# Patient Record
Sex: FEMALE | Race: WHITE | NOT HISPANIC OR LATINO | Employment: UNEMPLOYED | URBAN - METROPOLITAN AREA
[De-identification: names, ages, dates, MRNs, and addresses within clinical notes are randomized per-mention and may not be internally consistent; named-entity substitution may affect disease eponyms.]

---

## 2022-08-16 DIAGNOSIS — N93.9 ABNORMAL VAGINAL BLEEDING: Primary | ICD-10-CM

## 2022-08-29 ENCOUNTER — HOSPITAL ENCOUNTER (EMERGENCY)
Facility: HOSPITAL | Age: 42
Discharge: HOME | End: 2022-08-29
Attending: FAMILY MEDICINE
Payer: MEDICAID

## 2022-08-29 VITALS
HEART RATE: 92 BPM | OXYGEN SATURATION: 100 % | SYSTOLIC BLOOD PRESSURE: 128 MMHG | RESPIRATION RATE: 20 BRPM | HEIGHT: 62 IN | BODY MASS INDEX: 33.33 KG/M2 | DIASTOLIC BLOOD PRESSURE: 76 MMHG | WEIGHT: 181.13 LBS | TEMPERATURE: 98 F

## 2022-08-29 DIAGNOSIS — N93.9 ABNORMAL UTERINE BLEEDING (AUB): Primary | ICD-10-CM

## 2022-08-29 LAB
ALBUMIN SERPL-MCNC: 3.8 GM/DL (ref 3.5–5)
ALBUMIN/GLOB SERPL: 1.3 RATIO (ref 1.1–2)
ALP SERPL-CCNC: 37 UNIT/L (ref 40–150)
ALT SERPL-CCNC: 18 UNIT/L (ref 0–55)
APPEARANCE UR: CLEAR
AST SERPL-CCNC: 20 UNIT/L (ref 5–34)
B-HCG UR QL: NEGATIVE
BACTERIA #/AREA URNS AUTO: ABNORMAL /HPF
BASOPHILS # BLD AUTO: 0.04 X10(3)/MCL (ref 0–0.2)
BASOPHILS NFR BLD AUTO: 0.5 %
BILIRUB UR QL STRIP.AUTO: NEGATIVE MG/DL
BILIRUBIN DIRECT+TOT PNL SERPL-MCNC: 0.5 MG/DL
BUN SERPL-MCNC: 13.3 MG/DL (ref 7–18.7)
CALCIUM SERPL-MCNC: 8.9 MG/DL (ref 8.4–10.2)
CHLORIDE SERPL-SCNC: 108 MMOL/L (ref 98–107)
CO2 SERPL-SCNC: 26 MMOL/L (ref 22–29)
COLOR UR AUTO: YELLOW
CREAT SERPL-MCNC: 0.8 MG/DL (ref 0.55–1.02)
CTP QC/QA: YES
EOSINOPHIL # BLD AUTO: 0.19 X10(3)/MCL (ref 0–0.9)
EOSINOPHIL NFR BLD AUTO: 2.2 %
ERYTHROCYTE [DISTWIDTH] IN BLOOD BY AUTOMATED COUNT: 15.2 % (ref 11.5–17)
GFR SERPLBLD CREATININE-BSD FMLA CKD-EPI: >60 MLS/MIN/1.73/M2
GLOBULIN SER-MCNC: 2.9 GM/DL (ref 2.4–3.5)
GLUCOSE SERPL-MCNC: 97 MG/DL (ref 74–100)
GLUCOSE UR QL STRIP.AUTO: NORMAL MG/DL
HCT VFR BLD AUTO: 27.4 % (ref 37–47)
HGB BLD-MCNC: 8 GM/DL (ref 12–16)
HYALINE CASTS #/AREA URNS LPF: ABNORMAL /LPF
IMM GRANULOCYTES # BLD AUTO: 0.02 X10(3)/MCL (ref 0–0.04)
IMM GRANULOCYTES NFR BLD AUTO: 0.2 %
KETONES UR QL STRIP.AUTO: ABNORMAL MG/DL
LEUKOCYTE ESTERASE UR QL STRIP.AUTO: NEGATIVE UNIT/L
LYMPHOCYTES # BLD AUTO: 1.85 X10(3)/MCL (ref 0.6–4.6)
LYMPHOCYTES NFR BLD AUTO: 21.6 %
MCH RBC QN AUTO: 25.6 PG (ref 27–31)
MCHC RBC AUTO-ENTMCNC: 29.2 MG/DL (ref 33–36)
MCV RBC AUTO: 87.5 FL (ref 80–94)
MONOCYTES # BLD AUTO: 0.7 X10(3)/MCL (ref 0.1–1.3)
MONOCYTES NFR BLD AUTO: 8.2 %
MUCOUS THREADS URNS QL MICRO: ABNORMAL /LPF
NEUTROPHILS # BLD AUTO: 5.8 X10(3)/MCL (ref 2.1–9.2)
NEUTROPHILS NFR BLD AUTO: 67.3 %
NITRITE UR QL STRIP.AUTO: NEGATIVE
NRBC BLD AUTO-RTO: 0 %
PH UR STRIP.AUTO: 5.5 [PH]
PLATELET # BLD AUTO: 338 X10(3)/MCL (ref 130–400)
PMV BLD AUTO: 10.4 FL (ref 7.4–10.4)
POTASSIUM SERPL-SCNC: 4.1 MMOL/L (ref 3.5–5.1)
PROT SERPL-MCNC: 6.7 GM/DL (ref 6.4–8.3)
PROT UR QL STRIP.AUTO: ABNORMAL MG/DL
RBC # BLD AUTO: 3.13 X10(6)/MCL (ref 4.2–5.4)
RBC #/AREA URNS AUTO: ABNORMAL /HPF
RBC UR QL AUTO: ABNORMAL UNIT/L
SODIUM SERPL-SCNC: 140 MMOL/L (ref 136–145)
SP GR UR STRIP.AUTO: 1.03
SQUAMOUS #/AREA URNS LPF: ABNORMAL /HPF
UROBILINOGEN UR STRIP-ACNC: NORMAL MG/DL
WBC # SPEC AUTO: 8.6 X10(3)/MCL (ref 4.5–11.5)
WBC #/AREA URNS AUTO: ABNORMAL /HPF

## 2022-08-29 PROCEDURE — 80053 COMPREHEN METABOLIC PANEL: CPT | Performed by: FAMILY MEDICINE

## 2022-08-29 PROCEDURE — 81001 URINALYSIS AUTO W/SCOPE: CPT | Performed by: FAMILY MEDICINE

## 2022-08-29 PROCEDURE — 85025 COMPLETE CBC W/AUTO DIFF WBC: CPT | Performed by: FAMILY MEDICINE

## 2022-08-29 PROCEDURE — 36415 COLL VENOUS BLD VENIPUNCTURE: CPT | Performed by: FAMILY MEDICINE

## 2022-08-29 PROCEDURE — 81025 URINE PREGNANCY TEST: CPT | Performed by: FAMILY MEDICINE

## 2022-08-29 PROCEDURE — 99284 EMERGENCY DEPT VISIT MOD MDM: CPT | Mod: 25

## 2022-08-29 RX ORDER — IBUPROFEN 800 MG/1
800 TABLET ORAL EVERY 8 HOURS PRN
Qty: 30 TABLET | Refills: 0 | Status: SHIPPED | OUTPATIENT
Start: 2022-08-29

## 2022-08-29 RX ORDER — ONDANSETRON 4 MG/1
4 TABLET, ORALLY DISINTEGRATING ORAL EVERY 6 HOURS PRN
Qty: 10 TABLET | Refills: 0 | Status: SHIPPED | OUTPATIENT
Start: 2022-08-29 | End: 2022-09-03

## 2022-08-29 RX ORDER — MEDROXYPROGESTERONE ACETATE 10 MG/1
10 TABLET ORAL DAILY
Qty: 30 TABLET | Refills: 2 | Status: SHIPPED | OUTPATIENT
Start: 2022-08-29 | End: 2023-04-10

## 2022-08-29 NOTE — ED PROVIDER NOTES
Encounter Date: 8/29/2022       History     Chief Complaint   Patient presents with    Vaginal Bleeding     Reports persistent vaginal bleeding with lower abd pain x6 months. Was on norethindrone x2 weeks during this time which bleeding ceased but started immediately after completing that medication. Hx of ovarian cyst and thickening of the uterus.      42-year-old female presents emergency room with complaints persistent vaginal bleeding.  Patient reports having heavy menstrual bleeding for the past 6 months.  Currently awaiting to get scheduled with gyn clinic here, but reports she does not have a clinic appointment until November 7th.  Patient reports she was being treated with norethidrone x 2 weeks, but ran out of the medication on Friday (3 days prior) and has had heavy menstrual bleeding since that time.  Patient reports recently having a pelvic ultrasound on August 15th which showed a thickened endometrium.  This was performed in [place].      Review of patient's allergies indicates:  No Known Allergies  No past medical history on file.  No past surgical history on file.  No family history on file.     Review of Systems   Constitutional:  Negative for chills, fatigue and fever.   HENT:  Negative for ear pain, rhinorrhea and sore throat.    Eyes:  Negative for photophobia and pain.   Respiratory:  Negative for cough, shortness of breath and wheezing.    Cardiovascular:  Negative for chest pain.   Gastrointestinal:  Negative for abdominal pain, diarrhea, nausea and vomiting.   Genitourinary:  Positive for menstrual problem and vaginal bleeding. Negative for dysuria.   Neurological:  Negative for dizziness, weakness and headaches.   All other systems reviewed and are negative.    Physical Exam     Initial Vitals [08/29/22 1320]   BP Pulse Resp Temp SpO2   128/76 92 20 98.2 °F (36.8 °C) 100 %      MAP       --         Physical Exam    Nursing note and vitals reviewed.  Constitutional: She appears  well-developed and well-nourished. No distress.   HENT:   Head: Normocephalic and atraumatic.   Eyes: Conjunctivae and EOM are normal. Pupils are equal, round, and reactive to light.   Neck: Neck supple.   Normal range of motion.  Cardiovascular:  Normal rate, regular rhythm, normal heart sounds and intact distal pulses.           Pulmonary/Chest: Breath sounds normal. No respiratory distress. She has no wheezes. She has no rhonchi. She has no rales.   Abdominal: Abdomen is soft. Bowel sounds are normal. She exhibits no distension. There is no abdominal tenderness. There is no rebound and no guarding.   Musculoskeletal:         General: Normal range of motion.      Cervical back: Normal range of motion and neck supple.     Neurological: She is alert and oriented to person, place, and time.   Skin: Skin is warm and dry. Capillary refill takes less than 2 seconds. No erythema.   Psychiatric: She has a normal mood and affect. Her behavior is normal. Judgment and thought content normal.       ED Course   Procedures  Labs Reviewed   COMPREHENSIVE METABOLIC PANEL - Abnormal; Notable for the following components:       Result Value    Chloride 108 (*)     Alkaline Phosphatase 37 (*)     All other components within normal limits   URINALYSIS, REFLEX TO URINE CULTURE - Abnormal; Notable for the following components:    Protein, UA Trace (*)     Ketones, UA Trace (*)     Blood, UA 1+ (*)     Squamous Epithelial Cells, UA Trace (*)     Mucous, UA Moderate (*)     Hyaline Casts, UA 0-2 (*)     All other components within normal limits   CBC WITH DIFFERENTIAL - Abnormal; Notable for the following components:    RBC 3.13 (*)     Hgb 8.0 (*)     Hct 27.4 (*)     MCH 25.6 (*)     MCHC 29.2 (*)     All other components within normal limits   CBC W/ AUTO DIFFERENTIAL    Narrative:     The following orders were created for panel order CBC Auto Differential.  Procedure                               Abnormality         Status                      ---------                               -----------         ------                     CBC with Differential[[id number]]        Abnormal            Final result                 Please view results for these tests on the individual orders.   POCT URINE PREGNANCY          Imaging Results    None          Medications - No data to display              ED Course as of 08/29/22 1613   Mon Aug 29, 2022   1429 Color, UA: Yellow [MW]   1429 Appearance, UA: Clear [MW]   1429 Occult Blood UA(!): 1+ [MW]   1429 Mucous, UA(!): Moderate [MW]   1429 WBC, UA: 0-5 [MW]   1438 WBC: 8.6 [MW]   1438 Hemoglobin(!): 8.0 [MW]   1438 Hematocrit(!): 27.4 [MW]   1438 Platelets: 338 [MW]   1438 Mucous, UA(!): Moderate [MW]   1530 Discussed with GYN regarding patient - they request to come and see the patient.  Notified patient in room. [MW]   1606 Gyn has seen and evaluated the patient.  Please see consult note.  Ok to begin on Provera 10mg daily.  Will request outpatient Pelvic US.  Ok for discharge to home. [MW]      ED Course User Index  [MW] You Colón MD             Clinical Impression:   Final diagnoses:  [N93.9] Abnormal uterine bleeding (AUB) (Primary)      ED Disposition Condition    Discharge Stable          ED Prescriptions       Medication Sig Dispense Start Date End Date Auth. Provider    medroxyPROGESTERone (PROVERA) 10 MG tablet Take 1 tablet (10 mg total) by mouth once daily. 30 tablet 8/29/2022 8/29/2023 You Colón MD    ibuprofen (ADVIL,MOTRIN) 800 MG tablet Take 1 tablet (800 mg total) by mouth every 8 (eight) hours as needed for Pain. 30 tablet 8/29/2022 -- You Colón MD    ondansetron (ZOFRAN-ODT) 4 MG TbDL Take 1 tablet (4 mg total) by mouth every 6 (six) hours as needed (nausea vomiting). 10 tablet 8/29/2022 9/3/2022 You Colón MD          Follow-up Information       Follow up With Specialties Details Why Contact Info    [facility] Emergency  Medicine  As needed, If symptoms worsen [address] [address] [phone]    [facility] Gynecology   [address] [address]             You Colón MD  08/29/22 5567

## 2022-08-29 NOTE — CONSULTS
[facility] Gynecology Consult H&P     Subjective:      History of Present Illness:  Carmelo Nascimento is a 42 y.o.  presenting with 6 months hx of heavy, daily vaginal bleeding, requiring synchronous use of 2 pads and super absorbant tampons at a time. Reports going through a box of pads every other day, also notes associated cramping. States that prior to this episode she had regular monthly periods, with moderate flow. Saw a provider in [place] who prescribed her a 10 day course of Aygestin, states that her bleeding completely resolved while on this medication, only two return a day later,and two days prior to this ED presentation. No other complaints at this time, denies hx of known bleeding disorders or hx of blood transfusion.      Review of Systems:  All other systems are reviewed and are negative.    Past Medical History:  Denies     Past Surgical History:  BTL    Obstetrical History:  Term  x 4    Gynecologic History:   Previously regular periods, occurring monthly  Denies hx of abnormal pap smears    Allergies:  Review of patient's allergies indicates:  No Known Allergies    Medications:   In-Hospital Scheduled Medications:     In-Hospital PRN Medications:     In-Hospital IV Infusion Medications:     Home Medications:  Prior to Admission medications    Medication Sig Start Date End Date Taking? Authorizing Provider   ibuprofen (ADVIL,MOTRIN) 800 MG tablet Take 1 tablet (800 mg total) by mouth every 8 (eight) hours as needed for Pain. 22   You Colón MD   medroxyPROGESTERone (PROVERA) 10 MG tablet Take 1 tablet (10 mg total) by mouth once daily. 22  You Colón MD   ondansetron (ZOFRAN-ODT) 4 MG TbDL Take 1 tablet (4 mg total) by mouth every 6 (six) hours as needed (nausea vomiting). 8/29/22 9/3/22  You Colón MD       Family History:  No family history on file.    Social History:        Objective:   Last 24 Hour Vital Signs:  BP  Min: 128/76  " Max: 128/76  Temp  Av.2 °F (36.8 °C)  Min: 98.2 °F (36.8 °C)  Max: 98.2 °F (36.8 °C)  Pulse  Av  Min: 92  Max: 92  Resp  Av  Min: 20  Max: 20  SpO2  Av %  Min: 100 %  Max: 100 %  Height  Av' 2" (157.5 cm)  Min: 5' 2" (157.5 cm)  Max: 5' 2" (157.5 cm)  Weight  Av.2 kg (181 lb 1.7 oz)  Min: 82.2 kg (181 lb 1.7 oz)  Max: 82.2 kg (181 lb 1.7 oz)  No intake/output data recorded.  Body mass index is 33.13 kg/m².    Physical Examination:  Vitals:    22 1320   BP: 128/76   Pulse: 92   Resp: 20   Temp: 98.2 °F (36.8 °C)   SpO2: 100%   Weight: 82.2 kg (181 lb 1.7 oz)   Height: 5' 2" (1.575 m)       Body mass index is 33.13 kg/m².    Gen: Alert, cooperative, no distress, appears stated age  CV: RRR  Chest: CTABL, no wheezes/rales/rhonchi  Abdomen: Soft, non-tender, bowel sounds active all four quadrants, no masses  Extrem: Extremities normal, atraumatic, no cyanosis or edema.  No calf tenderness or erythema.  External genitalia: Normal female genetalia without lesion, discharge or tenderness.  Speculum Exam: Vaginal vault with scant white discharge, no blood, nonodorous, no lesions/masses seen.  Cervical os visualized as parous, closed, no lesions/masses.  Bimanual Exam: No cervical motion tenderness.  Uterus freely mobile.  Normal size uterus. No adnexal masses.  Nontender exam.    Laboratory Results:  22  CBC: 8.6>8.0/27.4<338    Radiology:  No results found.     Assessment:     Carmelo Nascimento is a 42 y.o. female who presented to the ED with heavy vaginal bleeding. Stable vital signs, no blood noted on today's exam, not symptomatic from anemia. Appropriate for discharge to home with provera tablets and outpatient management.      Recommendations:     - F/u as scheduled in Gyn clinic   - Take Provera tablets 10 mg once daily, can uptitrate to BID if needed   - Outpatient TVUS ordered     Discussed with staff, Dr. Provost Sienna Sanderson MD   [facility]/OBGYN PGY-2    "

## 2022-08-31 PROBLEM — N93.9 ABNORMAL UTERINE BLEEDING (AUB): Status: ACTIVE | Noted: 2022-08-31

## 2023-03-15 ENCOUNTER — OFFICE VISIT (OUTPATIENT)
Dept: GYNECOLOGY | Facility: CLINIC | Age: 43
End: 2023-03-15
Payer: MEDICAID

## 2023-03-15 ENCOUNTER — TELEPHONE (OUTPATIENT)
Dept: GYNECOLOGY | Facility: CLINIC | Age: 43
End: 2023-03-15
Payer: MEDICAID

## 2023-03-15 VITALS
TEMPERATURE: 98 F | OXYGEN SATURATION: 100 % | HEART RATE: 86 BPM | HEIGHT: 62 IN | RESPIRATION RATE: 18 BRPM | WEIGHT: 178.56 LBS | SYSTOLIC BLOOD PRESSURE: 124 MMHG | BODY MASS INDEX: 32.86 KG/M2 | DIASTOLIC BLOOD PRESSURE: 61 MMHG

## 2023-03-15 DIAGNOSIS — Z71.6 ENCOUNTER FOR TOBACCO USE CESSATION COUNSELING: Primary | ICD-10-CM

## 2023-03-15 DIAGNOSIS — Z12.4 CERVICAL CANCER SCREENING: ICD-10-CM

## 2023-03-15 DIAGNOSIS — N93.9 ABNORMAL VAGINAL BLEEDING: ICD-10-CM

## 2023-03-15 DIAGNOSIS — N93.9 ABNORMAL UTERINE BLEEDING (AUB): ICD-10-CM

## 2023-03-15 LAB
B-HCG UR QL: NEGATIVE
CTP QC/QA: YES

## 2023-03-15 PROCEDURE — 87624 HPV HI-RISK TYP POOLED RSLT: CPT

## 2023-03-15 PROCEDURE — 58100 BIOPSY OF UTERUS LINING: CPT | Mod: PBBFAC | Performed by: STUDENT IN AN ORGANIZED HEALTH CARE EDUCATION/TRAINING PROGRAM

## 2023-03-15 PROCEDURE — 88174 CYTOPATH C/V AUTO IN FLUID: CPT

## 2023-03-15 PROCEDURE — 81025 URINE PREGNANCY TEST: CPT | Mod: PBBFAC

## 2023-03-15 PROCEDURE — 99213 OFFICE O/P EST LOW 20 MIN: CPT | Mod: PBBFAC

## 2023-03-15 NOTE — TELEPHONE ENCOUNTER
Called pt to inform her of her upcoming virtual telemed appt on Monday, April 10, 2023 at 7:45AM. Pt confirmed and verbalized understanding.

## 2023-03-15 NOTE — PROGRESS NOTES
[facility] GYNECOLOGY CLINIC NOTE   Carmelo Nascimento is a 42 y.o.  presenting to GYN clinic for abnormal uterine bleeding. Patient's cycles became heavier and more painful after her BTL in . More recently, she was seen in the ED 22 for heavy bleeding occurring daily for 6 months. H/H on  was 8.0/27.4. Denied history of blood transfusions. TVUS showed 2 small fibroids, mild heterogenous, non-thickened endometrium. She was started on Provera which stopped her bleeding but stopped taking the Provera since she ran out. The bleeding restarted in November and lasted until 2023. She denies any vaginal bleeding since. She also denies any vaginal discharge, dysuria, or frequent UTI. Patient also complains of increased fatigue, night sweats, hot flashes, vaginal dryness and depression. She is sexually active with one partner and has occasional pain with penetration. She denies history of abnormal pap smear or sexually transmitted infections. She smokes 1 pack per day of cigarettes for 30 years and is interested in quitting.     Gynecology:  LMP: 2023  Pap smear: due for screening  Mammogram: UTD. Imaging in the last week    Obstetrics:  4 term     Gynecology  OB History          4    Para   4    Term                AB        Living   4         SAB        IAB        Ectopic        Multiple        Live Births   4                Past Medical History:   Diagnosis Date    Abnormal uterine bleeding 6months    Depression     I suffer with depression in the past but only lately has it gotten bad    Dyspareunia     Painful intercourse has been going on for a little over a year      Past Surgical History:   Procedure Laterality Date    TUBAL LIGATION  1999      Current Outpatient Medications   Medication Instructions    ibuprofen (ADVIL,MOTRIN) 800 mg, Oral, Every 8 hours PRN    medroxyPROGESTERone (PROVERA) 10 mg, Oral, Daily     Social History     Tobacco Use    Smoking  "status: Every Day     Packs/day: 1.00     Years: 15.00     Pack years: 15.00     Types: Cigarettes    Smokeless tobacco: Never   Substance Use Topics    Alcohol use: Never    Drug use: Never       Review of Systems  Pertinent items are noted in HPI.     Objective:     /61 (BP Location: Left arm, Patient Position: Sitting, BP Method: Large (Automatic))   Pulse 86   Temp 98.1 °F (36.7 °C)   Resp 18   Ht 5' 2" (1.575 m)   Wt 81 kg (178 lb 9.2 oz)   SpO2 100%   BMI 32.66 kg/m²   Physical Exam:  Gen: Well-nourished, well-developed female appearing stated age. Alert, cooperative, in no acute distress.  HEENT: No thyromegaly, goiter, or masses  CV: rrr, no murmurs/rubs/gallops  Chest: ctabl, no wheezes/rales/rhonchi  Abdomen: Soft, non-tender, no masses.  Extrem: Extremities normal, atraumatic, non-tender calves.  External genitalia: Normal female genetalia without lesion, discharge or tenderness.   Speculum Exam: Vaginal vault without discharge, nonodorous, no lesions/masses seen.  Cervical os visualized as closed, no lesions/masses.   Bimanual Exam: No cervical motion tenderness.  Uterus freely mobile.  10cm sizer uterus. No adnexal masses.  Nontender exam.   Note: RN chaperone present for entirety of exam.    Relevant Labs:   H/H 22: 8.0/27.4    Relevant Imaging:  TVUS 22:  Impression:  No acute pelvic pathology identified.     Two small uterine fibroids at the left lateral uterine body, the larger measuring 2.2 cm in greatest dimension.     Mildly heterogeneous but non thickened endometrial complex.     Single physiologic left ovarian cyst measuring 1.6 cm in greatest dimension.     The right ovary was unable to be visualized.      Assessment:       42 y.o.  here for evaluation of abnormal uterine bleeding and pap smear.    1. Encounter for tobacco use cessation counseling  Ambulatory referral/consult to Smoking Cessation Program      2. Abnormal vaginal bleeding  Ambulatory referral/consult " to Obstetrics / Gynecology    Follicle Stimulating Hormone    POCT Urine Pregnancy    Hemoglobin A1C    TSH    Prolactin    Estradiol    Liquid-Based Pap Smear, Screening Screening    Specimen to Pathology Gynecology and Obstetrics    CBC Auto Differential    Endometrial biopsy      3. Cervical cancer screening        4. Abnormal uterine bleeding (AUB)               Plan:         Problem List Items Addressed This Visit          Renal/    Abnormal uterine bleeding (AUB)     AUB labs collected today as patient is not on any hormonal contraceptives but is not having any cycles for the past three months. She is also now endorsing intermittent night sweats and chills - suspicious for perimenopausal symptoms.   Endometrial biopsy collected today  Pap smear performed today         Cervical cancer screening     Pap smear today          Other Visit Diagnoses       Encounter for tobacco use cessation counseling    -  Primary    Relevant Orders    Ambulatory referral/consult to Smoking Cessation Program    Abnormal vaginal bleeding        Relevant Orders    Follicle Stimulating Hormone (Completed)    POCT Urine Pregnancy (Completed)    Hemoglobin A1C (Completed)    TSH (Completed)    Prolactin (Completed)    Estradiol (Completed)    Liquid-Based Pap Smear, Screening Screening    Specimen to Pathology Gynecology and Obstetrics    CBC Auto Differential (Completed)    Endometrial biopsy (Completed)          Return to clinic 3 weeks telehealth visit for results of EMB and Pap smear    Discussed patient and plan with Dr. Provost Olayinka Ellis, MS3    Samantha Boyce MD PGY-4  Obstetrics and Gynecology

## 2023-03-16 LAB
DHEA SERPL-MCNC: NORMAL
ESTROGEN SERPL-MCNC: NORMAL PG/ML
INSULIN SERPL-ACNC: NORMAL U[IU]/ML
LAB AP CLINICAL INFORMATION: NORMAL
LAB AP GROSS DESCRIPTION: NORMAL
LAB AP REPORT FOOTNOTES: NORMAL
T3RU NFR SERPL: NORMAL %

## 2023-03-16 NOTE — ASSESSMENT & PLAN NOTE
· AUB labs collected today as patient is not on any hormonal contraceptives but is not having any cycles for the past three months. She is also now endorsing intermittent night sweats and chills - suspicious for perimenopausal symptoms.   · Endometrial biopsy collected today  · Pap smear performed today

## 2023-03-16 NOTE — PROCEDURES
Endometrial biopsy    Date/Time: 3/15/2023 10:15 AM  Performed by: Samantha Boyce MD  Authorized by: Samantha Boyce MD     Consent:     Consent obtained:  Written  Indication:     Indications: Menorrhagia    Pre-procedure:     Pre-procedure timeout performed: yes    Procedure:     Procedure: endometrial biopsy with Pipelle      Cervix cleaned and prepped: yes      Specimen collected: specimen collected and sent to pathology      Patient tolerated procedure well with no complications: yes

## 2023-03-17 LAB — PSYCHE PATHOLOGY RESULT: NORMAL

## 2023-04-10 ENCOUNTER — CLINICAL SUPPORT (OUTPATIENT)
Dept: GYNECOLOGY | Facility: CLINIC | Age: 43
End: 2023-04-10
Payer: MEDICAID

## 2023-04-10 DIAGNOSIS — F32.81 PMDD (PREMENSTRUAL DYSPHORIC DISORDER): Primary | ICD-10-CM

## 2023-04-10 DIAGNOSIS — K59.00 CONSTIPATION, UNSPECIFIED CONSTIPATION TYPE: ICD-10-CM

## 2023-04-10 RX ORDER — DROSPIRENONE AND ETHINYL ESTRADIOL 0.03MG-3MG
1 KIT ORAL DAILY
Qty: 30 TABLET | Refills: 5 | Status: SHIPPED | OUTPATIENT
Start: 2023-04-10 | End: 2023-05-10

## 2023-04-10 RX ORDER — POLYETHYLENE GLYCOL 3350 17 G/17G
17 POWDER, FOR SOLUTION ORAL DAILY
Qty: 30 EACH | Refills: 4 | Status: SHIPPED | OUTPATIENT
Start: 2023-04-10

## 2023-04-10 NOTE — PROGRESS NOTES
"Established Patient - Audio Only Telehealth Visit     The patient location is: [place] [place] [id number]  The chief complaint leading to consultation is: pap smear results, lab results, EMB results  Visit type: Virtual visit with audio only (telephone)  Total time spent with patient: 35 minutes       The reason for the audio only service rather than synchronous audio and video virtual visit was related to technical difficulties or patient preference/necessity.     Each patient to whom I provide medical services by telemedicine is:  (1) informed of the relationship between the physician and patient and the respective role of any other health care provider with respect to management of the patient; and (2) notified that they may decline to receive medical services by telemedicine and may withdraw from such care at any time. Patient verbally consented to receive this service via voice-only telephone call.       HPI:   This is a 44 y/o female with a past medical history of AUB on the prior visit 3/15/23.  At that time, labs were drawn and an EUB was performed.    Her onset of LLQ pain was 1-2 years ago. Continues to have pain in the lower left side of the abdomen.  She states that the pain is constant and is worse when she is on her menses and with movement.  She is using  8-9 pads per day.    She reports that she is having constipation and bloating.  Sometimes the pain is relieved after having a bowel movement. She denies blood on the stool or in the urine.    She has not been taking the provera for the past 6 months. She started working in [place] and didn't bring her medication with her.  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    She does endorse cramping, breast tenderness, fatigue and mood swings 1 week prior to her cycle. The symptoms do not resolve once the cycle starts.  She reports that she is "miserable, angry and lashes out at family members for no reason".  She " reports feeling depressed and has crying episodes daily and reports that these symptoms are interfering with her normal activities of living where she no longer enjoys things that she normally would. She denies HI/SI.  She is currently not on any antidepressants.  She reports that her cycles are irregular.  Her last cycle was 11/2023-1/2023.  Of note, after the biopsy she bled  from 3/15/23-4/5/23.      General: Alert and oriented, crying intermittently throughout the conversation  HEENT: Able to hear telephonic visit at a normal tone of voice  Respiratory: Able to speak in full sentences, no coughing  Neurologic: No issues with articulation      Assessment and plan:    Menorrhagia 2/2 AUB-L  PMDD   -Reviewed prior labs (3/15/23 CBC, CMP, TSH, A1C, estradiol, FSH and prolactin were unremarkable)  -Reviewed TVUS and pap smear results 3/15/23 with patient  -TVUS showed: No acute pelvic pathology identified. Two small uterine fibroids at the left lateral uterine body, the larger measuring 2.2 cm in greatest dimension. Mildly heterogeneous but non thickened endometrial complex. Single physiologic left ovarian cyst measuring 1.6 cm in greatest dimension.  -The right ovary was unable to be visualized.  -Ordered Johanna 3-0.03 mg daily to help regulate her cycles, decrease the bleeding and to address her severe mood changes     Physiologic Cyst  -It is likely that her abdominal pain is not coming from this physiologic cyst noted by incidental finding.  Will CTM.    Constipation versus diverticulitis  -Miralax OTC for suspected constipation versus diverticulitis that may be a plausible etiology for her LLQ pain     Dysmenorrhea  -Continue the ibuprofen prn pain. Take with food to reduce the incidence of gastric ulcer formation         Katheryn Moreno MD, PGY-2  [facility]     Staff: Dr. Giles         This service was not originating from a related E/M service provided within the previous 7 days nor  will  to an E/M service or procedure within the next 24 hours or my soonest available appointment.  Prevailing standard of care was able to be met in this audio-only visit.

## 2023-05-15 ENCOUNTER — TELEPHONE (OUTPATIENT)
Dept: GYNECOLOGY | Facility: CLINIC | Age: 43
End: 2023-05-15
Payer: MEDICAID

## 2023-05-17 ENCOUNTER — TELEPHONE (OUTPATIENT)
Dept: SMOKING CESSATION | Facility: CLINIC | Age: 43
End: 2023-05-17
Payer: MEDICAID

## 2023-09-28 ENCOUNTER — TELEPHONE (OUTPATIENT)
Dept: SMOKING CESSATION | Facility: CLINIC | Age: 43
End: 2023-09-28
Payer: MEDICAID

## 2023-09-28 NOTE — TELEPHONE ENCOUNTER
Pt had not shown up for her [facility] appt.  Called and spoke with pt.  Pt stated that she was still at work.  Pt would like to reschedule to a Wednesday.  Pt rescheduled to November 1, 2023 at 3 pm.

## 2023-11-01 ENCOUNTER — TELEPHONE (OUTPATIENT)
Dept: SMOKING CESSATION | Facility: CLINIC | Age: 43
End: 2023-11-01
Payer: MEDICAID

## 2023-11-01 NOTE — TELEPHONE ENCOUNTER
Pt had not shown up for her smoking cessation appointment  Called pt.  No answer.  Left voice message with contact information.